# Patient Record
Sex: MALE | Race: OTHER | HISPANIC OR LATINO
[De-identification: names, ages, dates, MRNs, and addresses within clinical notes are randomized per-mention and may not be internally consistent; named-entity substitution may affect disease eponyms.]

---

## 2021-05-11 PROBLEM — Z00.00 ENCOUNTER FOR PREVENTIVE HEALTH EXAMINATION: Status: ACTIVE | Noted: 2021-05-11

## 2021-05-14 ENCOUNTER — APPOINTMENT (OUTPATIENT)
Dept: GASTROENTEROLOGY | Facility: CLINIC | Age: 56
End: 2021-05-14
Payer: MEDICAID

## 2021-05-14 VITALS
SYSTOLIC BLOOD PRESSURE: 107 MMHG | TEMPERATURE: 98.2 F | OXYGEN SATURATION: 98 % | WEIGHT: 175 LBS | HEIGHT: 66 IN | BODY MASS INDEX: 28.12 KG/M2 | HEART RATE: 102 BPM | DIASTOLIC BLOOD PRESSURE: 73 MMHG

## 2021-05-14 DIAGNOSIS — R10.13 EPIGASTRIC PAIN: ICD-10-CM

## 2021-05-14 DIAGNOSIS — R11.2 NAUSEA WITH VOMITING, UNSPECIFIED: ICD-10-CM

## 2021-05-14 DIAGNOSIS — F17.200 NICOTINE DEPENDENCE, UNSPECIFIED, UNCOMPLICATED: ICD-10-CM

## 2021-05-14 DIAGNOSIS — Z78.9 OTHER SPECIFIED HEALTH STATUS: ICD-10-CM

## 2021-05-14 DIAGNOSIS — Z72.89 OTHER PROBLEMS RELATED TO LIFESTYLE: ICD-10-CM

## 2021-05-14 DIAGNOSIS — Z83.3 FAMILY HISTORY OF DIABETES MELLITUS: ICD-10-CM

## 2021-05-14 DIAGNOSIS — Z12.11 ENCOUNTER FOR SCREENING FOR MALIGNANT NEOPLASM OF COLON: ICD-10-CM

## 2021-05-14 DIAGNOSIS — Z01.818 ENCOUNTER FOR OTHER PREPROCEDURAL EXAMINATION: ICD-10-CM

## 2021-05-14 DIAGNOSIS — R63.4 ABNORMAL WEIGHT LOSS: ICD-10-CM

## 2021-05-14 DIAGNOSIS — F10.10 ALCOHOL ABUSE, UNCOMPLICATED: ICD-10-CM

## 2021-05-14 DIAGNOSIS — K21.9 GASTRO-ESOPHAGEAL REFLUX DISEASE W/OUT ESOPHAGITIS: ICD-10-CM

## 2021-05-14 PROCEDURE — 99072 ADDL SUPL MATRL&STAF TM PHE: CPT

## 2021-05-14 PROCEDURE — 99204 OFFICE O/P NEW MOD 45 MIN: CPT

## 2021-05-14 RX ORDER — METFORMIN HYDROCHLORIDE 500 MG/5ML
SOLUTION ORAL
Refills: 0 | Status: ACTIVE | COMMUNITY

## 2021-05-14 RX ORDER — FAMOTIDINE 40 MG/1
40 TABLET, FILM COATED ORAL
Qty: 60 | Refills: 3 | Status: ACTIVE | COMMUNITY
Start: 2021-05-14 | End: 1900-01-01

## 2021-05-14 RX ORDER — SODIUM SULFATE, POTASSIUM SULFATE, MAGNESIUM SULFATE 17.5; 3.13; 1.6 G/ML; G/ML; G/ML
17.5-3.13-1.6 SOLUTION, CONCENTRATE ORAL
Qty: 1 | Refills: 0 | Status: ACTIVE | COMMUNITY
Start: 2021-05-14 | End: 1900-01-01

## 2021-05-14 NOTE — REVIEW OF SYSTEMS
[Feeling Tired] : feeling tired [Eyesight Problems] : eyesight problems [SOB on Exertion] : shortness of breath during exertion [Vomiting] : vomiting [Diarrhea] : diarrhea [Heartburn] : heartburn [Nocturia] : nocturia [Arthralgias] : arthralgias [Joint Pain] : joint pain [Anxiety] : anxiety [Negative] : Heme/Lymph [FreeTextEntry8] : polyuria, nocturia,  [FreeTextEntry9] : back pains [de-identified] : tremors

## 2021-05-14 NOTE — HISTORY OF PRESENT ILLNESS
[None] : had no significant interval events [Constipation] : denies constipation [Yellow Skin Or Eyes (Jaundice)] : denies jaundice [Abdominal Pain] : denies abdominal pain [Rectal Pain] : denies rectal pain [Wt Loss ___ Lbs] : recent [unfilled] ~Upound(s) weight loss [Heartburn] : heartburn [Nausea] : nausea [Vomiting] : vomiting [Diarrhea] : diarrhea [Abdominal Swelling] : abdominal swelling [GERD] : gastroesophageal reflux disease [Alcohol Abuse] : alcohol abuse [Wt Gain ___ Lbs] : no recent weight gain [Hiatus Hernia] : no hiatus hernia [Peptic Ulcer Disease] : no peptic ulcer disease [Pancreatitis] : no pancreatitis [Cholelithiasis] : no cholelithiasis [Kidney Stone] : no kidney stone [Inflammatory Bowel Disease] : no inflammatory bowel disease [Irritable Bowel Syndrome] : no irritable bowel syndrome [Diverticulitis] : no diverticulitis [Malignancy] : no malignancy [Abdominal Surgery] : no abdominal surgery [Appendectomy] : no appendectomy [Cholecystectomy] : no cholecystectomy [de-identified] : The patient is a 55-year-old  male with past medical history significant for hypertension and diabetes mellitus who was referred to my office by Dr. Isabel Madera for dyspepsia, gastroesophageal reflux disease and nausea/vomiting. The patient also admits to having diarrhea, change in bowel habits and change in caliber of stool. I was asked to render an opinion for consultation for the above complaints.   The patient states that he is feeling tired. The patient admits to a history of fatty liver.  The patient denies any prior exposure to hepatitis A, B or C.  The patient denies any large doses of nonsteroidal anti-inflammatory drugs or acetaminophen.   The patient denies sharing needles, razors, nail clippers, nail files, scissors, et cetera.  The patient admits to EtOH abuse but denies any cocaine use and intravenous drug use.  The patient denies any prior surgery, blood transfusions, sexual indiscretions, tattoos or piercing.  The patient admits to a family history of GI problems.  The patient’s mother had a history of gastritis.  The patient denies any abdominal pain.  The patient complains of abdominal gas and bloating.  The patient complains of nausea and vomiting.  The patient complains of gastroesophageal reflux disease but denies any dysphagia. The gastroesophageal reflux disease is worse after meals and during the day. The patient denies taking medications for the gastroesophageal reflux disease such as proton pump inhibitors, H2 blockers or antacids.  The patient complains of dyspnea upon exertion but denies any atypical chest pain, shortness of breath or palpitations.  The patient admits to occasional episodes of diaphoresis.  The patient complains of diarrhea but denies any constipation.  The patient has 1 bowel movement a day.  The patient complains of a change in bowel habits.  The patient complains of a change in caliber of stool.  The diarrhea is described as soft in nature.  The patient denies having mucus discharge with the bowel movements.  The patient denies any bright red blood per rectum, melena or hematemesis.  The patient denies any rectal pain or rectal pruritus. The patient complains of weight loss but denies any anorexia.  The patient admits to losing 50 pounds over the past 3 years. He attributes the weight loss to prior ETOH abuse.    He denies any fevers or chills.  The patient denies any jaundice or pruritus.  The patient complains of chronic lower back pain.  The patient admits to having a prior colonoscopy performed by another gastroenterologist.  According to the patient, the colonoscopy in [facility] revealed a normal study.   The patient admits to a family history of GI problems.  The patient’s mother had a history of gastritis. [de-identified] : (+) smoking 1 to 2 cigarettes a day x 40 years, (+) prior  ETOH 12 pack and 2 pints of alcohol x 3 years stopped x 1 week, (-) IVDA\par

## 2021-05-14 NOTE — ASSESSMENT
[FreeTextEntry1] : Dyspepsia: The patient complains of dyspeptic symptoms.  The patient was advised to abide by an anti-gas diet.  The patient was given a pamphlet for anti-gas.  The patient and I reviewed the anti-gas diet at length. The patient is to start on a trial of Phazyme one tablet 3 times a day p.r.n. abdominal pain and gas.\par GERD: The patient was advised to avoid late-night meals and dietary indiscretions.  The patient was advised to avoid fried and fatty foods.  The patient was advised to abide by an anti-GERD diet. The patient was given a pamphlet for anti-GERD.  The patient and I reviewed the anti-GERD diet at length. I recommend a trial of Pepcid 40 mg twice a day x 3 months for the symptoms.\par Diarrhea: The patient complains of diarrhea.  I recommend a low residue diet. The patient is to avoid fiber supplementation. The patient is to consider starting a trial of a probiotic such as Align once a day.   If the symptoms persist, the patient may require sending stool studies for C+S, O+P x3, and C. difficile to assess for an infectious etiology of the diarrhea.  The patient agreed and will follow-up to reassess the symptoms.  \par History of ETOH Abuse: The patient and I had a long discussion regarding the importance of abstinence of alcohol because of the risk of cirrhosis and it’s complications. The patient is aware of the risks of further progression of liver disease while drinking alcohol or taking hepato-toxic agents.  The patient is currently trying to abstain from alcohol use.  The patient was advised to avoid alcohol and hepatic toxic agents.  The patient was advised to avoid large doses of nonsteroidal and inflammatory drugs or acetaminophen.\par Upper Endoscopy: I recommend an upper endoscopy  to assess the symptoms for peptic ulcer disease versus esophagitis.  The patient was told of the risks and benefits of the procedure.  The patient was told of the risks of perforation, emergency surgery, bleeding, infections and missed lesions. The patient agreed and will schedule for procedure. The patient is to be n.p.o. after midnight.  The patient is to return for the procedure. \par Colon Cancer screening: I recommend colonoscopy for colon cancer screening over the age of 45 to assess for colonic polyps. The patient was told of the risks and benefits of the procedure.  The patient was told of the risks of perforation, emergency surgery, bleeding, infections and missed lesions. The patient agreed and will schedule for the procedure. The patient is to be n.p.o. after midnight and bowel prep was given.  The patient is to return for the procedure.\par Colonoscopy: I recommend a colonoscopy to assess the symptoms.  The patient was told of the risks and benefits of the procedure.  The patient was told of the risks of perforation, emergency surgery, bleeding, infections and missed lesions.  The patient agreed and will schedule for the procedure. The patient can take the antihypertensive medication with a sip of water one hour prior to the procedure. The patient is to hold the diabetic medication the day before and the morning of the procedure. The patient is to hold the blood thinner medication for 5 days prior to the procedure. The patient is to be n.p.o. after midnight and bowel prep was given.  The patient is to return for the procedure. \par Weight Loss: The patient complains of weight loss but denies any anorexia.  The patient admits to losing 50 pounds over the past 3 years. If the weight loss persists, the patient may require a CAT scan of the abdomen and pelvis to assess for an occult GI malignancy.  The patient agreed and will follow-up.\par I recommend a urological evaluation for erectile dysfunction.\par Follow-up: The patient is to follow-up in the office in 4 weeks to reassess the symptoms. The patient was told to call the office if any further problems. \par \par \par

## 2021-05-17 LAB — HEMOCCULT STL QL: NEGATIVE

## 2021-10-13 LAB — SARS-COV-2 N GENE NPH QL NAA+PROBE: NOT DETECTED

## 2021-11-02 DIAGNOSIS — Z01.818 ENCOUNTER FOR OTHER PREPROCEDURAL EXAMINATION: ICD-10-CM

## 2021-11-06 ENCOUNTER — APPOINTMENT (OUTPATIENT)
Dept: DISASTER EMERGENCY | Facility: CLINIC | Age: 56
End: 2021-11-06

## 2021-11-09 ENCOUNTER — APPOINTMENT (OUTPATIENT)
Dept: GASTROENTEROLOGY | Facility: HOSPITAL | Age: 56
End: 2021-11-09

## 2021-11-18 ENCOUNTER — APPOINTMENT (OUTPATIENT)
Dept: GASTROENTEROLOGY | Facility: HOSPITAL | Age: 56
End: 2021-11-18

## 2024-03-07 ENCOUNTER — APPOINTMENT (OUTPATIENT)
Dept: SURGERY | Facility: CLINIC | Age: 59
End: 2024-03-07